# Patient Record
Sex: FEMALE | Race: WHITE | URBAN - NONMETROPOLITAN AREA
[De-identification: names, ages, dates, MRNs, and addresses within clinical notes are randomized per-mention and may not be internally consistent; named-entity substitution may affect disease eponyms.]

---

## 2021-11-17 ENCOUNTER — HOSPITAL ENCOUNTER (OUTPATIENT)
Dept: ULTRASOUND IMAGING | Age: 61
Discharge: HOME | End: 2021-11-17
Payer: COMMERCIAL

## 2021-11-17 DIAGNOSIS — R60.0 LOCALIZED EDEMA: ICD-10-CM

## 2021-11-17 PROCEDURE — 93971 EXTREMITY STUDY: CPT

## 2022-10-04 ENCOUNTER — APPOINTMENT (OUTPATIENT)
Dept: GENERAL RADIOLOGY | Age: 62
End: 2022-10-04
Payer: COMMERCIAL

## 2022-10-04 ENCOUNTER — HOSPITAL ENCOUNTER (EMERGENCY)
Age: 62
Discharge: HOME | End: 2022-10-04
Attending: EMERGENCY MEDICINE
Payer: COMMERCIAL

## 2022-10-04 VITALS
OXYGEN SATURATION: 96 % | SYSTOLIC BLOOD PRESSURE: 139 MMHG | WEIGHT: 125 LBS | BODY MASS INDEX: 22.15 KG/M2 | HEART RATE: 87 BPM | TEMPERATURE: 98.7 F | DIASTOLIC BLOOD PRESSURE: 91 MMHG | HEIGHT: 63 IN | RESPIRATION RATE: 16 BRPM

## 2022-10-04 DIAGNOSIS — S42.202A CLOSED FRACTURE OF PROXIMAL END OF LEFT HUMERUS, UNSPECIFIED FRACTURE MORPHOLOGY, INITIAL ENCOUNTER: Primary | ICD-10-CM

## 2022-10-04 PROCEDURE — 73030 X-RAY EXAM OF SHOULDER: CPT

## 2022-10-04 PROCEDURE — 99284 EMERGENCY DEPT VISIT MOD MDM: CPT

## 2022-10-04 PROCEDURE — 96374 THER/PROPH/DIAG INJ IV PUSH: CPT

## 2022-10-04 PROCEDURE — 6360000002 HC RX W HCPCS: Performed by: EMERGENCY MEDICINE

## 2022-10-04 RX ORDER — CARBAMAZEPINE 100 MG/1
100 CAPSULE, EXTENDED RELEASE ORAL 2 TIMES DAILY
COMMUNITY
Start: 2020-12-17

## 2022-10-04 RX ORDER — FLUTICASONE FUROATE, UMECLIDINIUM BROMIDE AND VILANTEROL TRIFENATATE 200; 62.5; 25 UG/1; UG/1; UG/1
POWDER RESPIRATORY (INHALATION)
COMMUNITY
Start: 2022-10-03

## 2022-10-04 RX ORDER — OXYCODONE HYDROCHLORIDE AND ACETAMINOPHEN 5; 325 MG/1; MG/1
1 TABLET ORAL EVERY 6 HOURS PRN
Qty: 20 TABLET | Refills: 0 | Status: SHIPPED | OUTPATIENT
Start: 2022-10-04 | End: 2022-10-09

## 2022-10-04 RX ORDER — AMLODIPINE BESYLATE 2.5 MG/1
2.5 TABLET ORAL DAILY
COMMUNITY
Start: 2020-12-14

## 2022-10-04 RX ORDER — ARIPIPRAZOLE 5 MG/1
TABLET ORAL
COMMUNITY
Start: 2022-08-22

## 2022-10-04 RX ORDER — DULOXETIN HYDROCHLORIDE 60 MG/1
CAPSULE, DELAYED RELEASE ORAL
COMMUNITY
Start: 2022-09-12

## 2022-10-04 RX ORDER — FENTANYL CITRATE 50 UG/ML
100 INJECTION, SOLUTION INTRAMUSCULAR; INTRAVENOUS ONCE
Status: COMPLETED | OUTPATIENT
Start: 2022-10-04 | End: 2022-10-04

## 2022-10-04 RX ADMIN — FENTANYL CITRATE 100 MCG: 50 INJECTION, SOLUTION INTRAMUSCULAR; INTRAVENOUS at 12:56

## 2022-10-04 ASSESSMENT — PAIN DESCRIPTION - LOCATION: LOCATION: SHOULDER

## 2022-10-04 ASSESSMENT — PAIN DESCRIPTION - DESCRIPTORS: DESCRIPTORS: THROBBING

## 2022-10-04 ASSESSMENT — PAIN - FUNCTIONAL ASSESSMENT: PAIN_FUNCTIONAL_ASSESSMENT: 0-10

## 2022-10-04 ASSESSMENT — PAIN SCALES - GENERAL
PAINLEVEL_OUTOF10: 6
PAINLEVEL_OUTOF10: 10
PAINLEVEL_OUTOF10: 10

## 2022-10-04 ASSESSMENT — PAIN DESCRIPTION - ORIENTATION: ORIENTATION: LEFT

## 2022-10-04 NOTE — ED TRIAGE NOTES
Arrived ambulatory to room 7-1 for triage. Tolerated without difficulty. Bed in lowest position. Call light given.  Gowned for exam. Telephone Encounter by Amrik Fong at 08/29/17 11:09 AM     Author:  Amrik Fong Service:  (none) Author Type:  Certified Medical Assistant     Filed:  08/29/17 11:09 AM Encounter Date:  8/29/2017 Status:  Signed     :  Rosanna Panda (Certified Medical Assistant)            Patient response noted via [employer].   Patient notified[CY1.1T] via [employer]CY1.1M]      Revision History        User Key Date/Time User Provider Type Action    > CY1.1 08/29/17 11:09 AM Rosanna Panda Certified Medical Assistant Sign    M - Manual, T - Template

## 2022-10-04 NOTE — ED PROVIDER NOTES
Triage Chief Complaint:   Fall (Patient states she tripped going up her porch, landing on her left side. C/o left shoulder pain. Patient denies LOC. ) and Shoulder Pain    [place]:  Regla Jackson is a 62 y.o. female that presents to the ED ambulatory. She fell going up some steps went to the [facility] was a long wait decided to come to our ED. She presents to the ambulatory very uncomfortable setting operating bed with an ice pack on she is grossly deformed left shoulder with swelling denies striking her head. No cervical neck pain. Pain is severe 10 out of 10 constant nonradiating. No sense of shortness of breath        Past Medical History:   Diagnosis Date    Anxiety     Hypertension      Past Surgical History:   Procedure Laterality Date    HYSTERECTOMY (CERVIX STATUS UNKNOWN)       History reviewed. No pertinent family history.   Social History     Socioeconomic History    Marital status:      Spouse name: Not on file    Number of children: Not on file    Years of education: Not on file    Highest education level: Not on file   Occupational History    Not on file   Tobacco Use    Smoking status: Every Day     Packs/day: 1.00     Types: Cigarettes    Smokeless tobacco: Never   Vaping Use    Vaping Use: Never used   Substance and Sexual Activity    Alcohol use: No    Drug use: Not Currently     Comment: OPIATES    Sexual activity: Not on file   Other Topics Concern    Not on file   Social History Narrative    Not on file     Social Determinants of Health     Financial Resource Strain: Not on file   Food Insecurity: Not on file   Transportation Needs: Not on file   Physical Activity: Not on file   Stress: Not on file   Social Connections: Not on file   Intimate Partner Violence: Not on file   Housing Stability: Not on file     Current Facility-Administered Medications   Medication Dose Route Frequency Provider Last Rate Last Admin    fentaNYL (SUBLIMAZE) injection 100 mcg  100 mcg IntraVENous Once Shira Sherman, DO         Current Outpatient Medications   Medication Sig Dispense Refill    carBAMazepine (CARBATROL) 100 MG extended release capsule Take 100 mg by mouth 2 times daily      amLODIPine (NORVASC) 2.5 MG tablet Take 2.5 mg by mouth daily      oxyCODONE-acetaminophen (PERCOCET) 5-325 MG per tablet Take 1 tablet by mouth every 6 hours as needed for Pain for up to 5 days. Intended supply: 5 days. Take lowest dose possible to manage pain 20 tablet 0    ARIPiprazole (ABILIFY) 5 MG tablet TAKE 1 TABLET BY MOUTH TWICE A DAY      DULoxetine (CYMBALTA) 60 MG extended release capsule TAKE 1 CAPSULE BY MOUTH TWICE A DAY      TRELEGY ELLIPTA 200-62.5-25 MCG/INH AEPB       gabapentin (NEURONTIN) 800 MG tablet Take 800 mg by mouth in the morning, at noon, in the evening, and at bedtime. propranolol (INDERAL) 10 MG tablet Take 10 mg by mouth daily. No Known Allergies      ROS:    Review of Systems   Constitutional: Negative. Musculoskeletal:  Positive for joint swelling. Neurological: Negative. Hematological: Negative. All other systems reviewed and are negative. Nursing Notes Reviewed    Physical Exam:      ED Triage Vitals [10/04/22 1156]   Enc Vitals Group      BP (!) 155/85      Heart Rate 81      Resp 16      Temp 98.7 °F (37.1 °C)      Temp Source Oral      SpO2 96 %      Weight 125 lb (56.7 kg)      Height 5' 3\" (1.6 m)      Head Circumference       Peak Flow       Pain Score       Pain Loc       Pain Edu? Excl. in [address]? Physical Exam  Vitals and nursing note reviewed. Exam conducted with a chaperone present. Constitutional:       General: She is in acute distress. Appearance: She is well-developed. She is ill-appearing and toxic-appearing. HENT:      Head: Normocephalic and atraumatic. Eyes:      Pupils: Pupils are equal, round, and reactive to light. Musculoskeletal:      Left shoulder: Swelling, deformity, tenderness and bony tenderness present. Decreased range of motion. Decreased strength. Normal pulse. Cervical back: Normal, normal range of motion and neck supple. Thoracic back: Normal.      Lumbar back: Normal.   Skin:     General: Skin is warm and dry. Neurological:      Mental Status: She is alert and oriented to person, place, and time. I have reviewed and interpreted all of the currently available lab results from this visit (ifapplicable):  No results found for this visit on 10/04/22. Radiographs (if obtained):  [] The following radiograph wasinterpreted by myself in the absence of a radiologist:   [] Radiologist's Report Reviewed:  XR SHOULDER LEFT (MIN 2 VIEWS)    (Results Pending)         EKG (if obtained): (All EKG's are interpreted by myself in the absence of a cardiologist)    Chart review shows recent radiographs:  No results found. MDM:      Patient presents ED status post fall. She is deformity to her shoulder intact x-ray already median nerve for sensation. Compartments are soft. X-ray reveals a anatomical neck fracture displacement minimally of her greater tuberosity. Sling will be recommended ice rest Percocet sedation precautions given follow-up with orthopedics  Please note that portions of this note may have been completed with a voice recognition/dictation program. Efforts were made to edit the dictations but occasionally words are mis-transcribed. All pertinent Lab data and radiographic results reviewed with patient at bedside. The patient and/or the family were informed of the results of any tests/labs/imaging, the treatment plan, and time was allotted to answer questions. See chart for details of medications given during the ED stay. The likelihood of other entities in the differential is insufficient to justify any further testing for them. This was explained to the patient. The patient was advised that persistent or worsening symptoms would require further evaluation.                 Is this patient to be included in the SEP-1 Core Measure due to severe sepsis or septic shock? No   Exclusion criteria - the patient is NOT to be included for SEP-1 Core Measure due to: Infection is not suspected       Clinical Impression:  1. Closed fracture of proximal end of left humerus, unspecified fracture morphology, initial encounter      Disposition referral (if applicable):  Teddy Olszewski, DO  [address] [facility] [address]  [phone]    Schedule an appointment as soon as possible for a visit     Disposition medications (if applicable):  New Prescriptions    OXYCODONE-ACETAMINOPHEN (PERCOCET) 5-325 MG PER TABLET    Take 1 tablet by mouth every 6 hours as needed for Pain for up to 5 days. Intended supply: 5 days. Take lowest dose possible to manage pain           Frank Iqbal DO, FACEP      Comment: Please note this report has been produced using speech recognition software and maycontain errors related to that system including errors in grammar, punctuation, and spelling, as well as words and phrases that may be inappropriate. If there are any questions or concerns please feel free to contact thedictating provider for clarification.         Maldonado Lee DO  10/04/22 5499

## 2022-10-12 ENCOUNTER — OFFICE VISIT (OUTPATIENT)
Dept: ORTHOPEDIC SURGERY | Age: 62
End: 2022-10-12
Payer: COMMERCIAL

## 2022-10-12 VITALS
SYSTOLIC BLOOD PRESSURE: 148 MMHG | DIASTOLIC BLOOD PRESSURE: 102 MMHG | HEIGHT: 63 IN | WEIGHT: 125 LBS | RESPIRATION RATE: 15 BRPM | BODY MASS INDEX: 22.15 KG/M2

## 2022-10-12 DIAGNOSIS — S42.202A CLOSED FRACTURE OF PROXIMAL END OF LEFT HUMERUS, UNSPECIFIED FRACTURE MORPHOLOGY, INITIAL ENCOUNTER: Primary | ICD-10-CM

## 2022-10-12 PROCEDURE — 23600 CLTX PROX HUMRL FX W/O MNPJ: CPT | Performed by: ORTHOPAEDIC SURGERY

## 2022-10-12 PROCEDURE — 99203 OFFICE O/P NEW LOW 30 MIN: CPT | Performed by: ORTHOPAEDIC SURGERY

## 2022-10-12 RX ORDER — ALBUTEROL SULFATE 90 UG/1
2 AEROSOL, METERED RESPIRATORY (INHALATION) EVERY 6 HOURS PRN
COMMUNITY
Start: 2021-10-13

## 2022-10-12 RX ORDER — DULOXETIN HYDROCHLORIDE 60 MG/1
60 CAPSULE, DELAYED RELEASE ORAL 2 TIMES DAILY
COMMUNITY
Start: 2020-11-14

## 2022-10-12 RX ORDER — OXYCODONE HYDROCHLORIDE AND ACETAMINOPHEN 5; 325 MG/1; MG/1
1 TABLET ORAL EVERY 6 HOURS PRN
Qty: 28 TABLET | Refills: 0 | Status: SHIPPED | OUTPATIENT
Start: 2022-10-12 | End: 2022-10-19 | Stop reason: SDUPTHER

## 2022-10-12 ASSESSMENT — ENCOUNTER SYMPTOMS
COLOR CHANGE: 0
EYE REDNESS: 0
ABDOMINAL PAIN: 0
SHORTNESS OF BREATH: 0

## 2022-10-12 NOTE — PROGRESS NOTES
Patient presents to the office today for evaluation of the left humerus neck DOI 10/4/22. Pt states she fell and landed on her left shoulder. Pain today is a 10/10 starts in the shoulder and will travel into the elbow and hand. Pt states pain will increase with movement. She has been wearing her sling.

## 2022-10-12 NOTE — PROGRESS NOTES
Subjective:      Patient ID: Johny Watkins is a 62 y.o. female. Patient presents to the office today for evaluation of the left humerus neck DOI 10/4/22. Pt states she fell and landed on her left shoulder. Pain today is a 10/10 starts in the shoulder and will travel into the elbow and hand. Pt states pain will increase with movement. She has been wearing her sling. She comes in today for her first visit with me in regards to her left shoulder injury. She states that since the injury she continues having deep, aching and throbbing pain globally in her left shoulder as well as significant bruising and mild swelling. Patient denies any prior injury to the involved extremity/ joint, denies numbness or tingling in the involved extremity and denies fever or chills. Review of Systems   Constitutional:  Negative for activity change, chills and fever. HENT:  Negative for congestion and drooling. Eyes:  Negative for redness. Respiratory:  Negative for shortness of breath. Cardiovascular:  Negative for chest pain. Gastrointestinal:  Negative for abdominal pain. Endocrine: Negative for cold intolerance and heat intolerance. Musculoskeletal:  Positive for arthralgias, joint swelling and myalgias. Negative for gait problem. Skin:  Negative for color change, pallor, rash and wound. Neurological:  Positive for weakness. Negative for numbness. Psychiatric/Behavioral:  Negative for confusion. Past Medical History:   Diagnosis Date    Anxiety     Hypertension        Objective:   Physical Exam  Constitutional:       Appearance: She is well-developed. HENT:      Head: Normocephalic and atraumatic. Nose: No congestion. Eyes:      Pupils: Pupils are equal, round, and reactive to light. Pulmonary:      Effort: Pulmonary effort is normal.   Musculoskeletal:         General: Swelling, tenderness and signs of injury present. No deformity.       Right shoulder: No swelling, deformity, effusion, laceration, tenderness, bony tenderness or crepitus. Normal range of motion. Normal strength. Normal pulse. Left shoulder: Swelling, tenderness and bony tenderness present. No deformity, effusion, laceration or crepitus. Decreased range of motion. Decreased strength. Normal pulse. Right elbow: Normal.      Left elbow: Normal.      Cervical back: Normal range of motion. Skin:     General: Skin is warm and dry. Coloration: Skin is not pale. Findings: Bruising present. No erythema or rash. Neurological:      Mental Status: She is alert and oriented to person, place, and time. Sensory: No sensory deficit. Left shoulder-skin intact, moderate diffuse ecchymosis, mild swelling. Full range of motion of the left elbow and left wrist.  Full range of motion not assessed due to recent injury. Intact sensation motor function to all nerve distributions of the extremity. +2 radial pulse  Compartment soft. XR SHOULDER LEFT (MIN 2 VIEWS)    Result Date: 10/12/2022  XRAY X-ray 3 views of the left shoulder obtained and reviewed by me today in the office demonstrates age appropriate bone density throughout with continued minimally displaced transverse fracture through the humeral neck as well as a separate vertical fracture through the greater tuberosity, there has been no new displacement or angulation of the fracture fragments compared to prior x-rays with no subluxation or dislocation noted. Impression: Stable left three-part proximal humerus fracture. Assessment:      Left proximal humerus fracture, 3 part, 1 week      Plan:      I discussed with her today her x-ray findings. I explained to her that she does have a fracture in her left shoulder which remains in good alignment and will heal with conservative treatment. At this point she can begin pendulum exercises as tolerated. Continue using sling as needed for comfort. No lifting, pushing, pulling with affected arm.   Ice as needed. Pain medication as needed. Follow-up in 1 month for recheck with x-rays of the left shoulder at which point we will likely begin physical therapy. I did send her in a prescription for Percocet to be taken as needed.             Jeison 97, DO

## 2022-10-12 NOTE — PATIENT INSTRUCTIONS
Continue to avoid any lifting pushing or pulling of the left arm  Continue range of motion exercises as instructed. Ice and elevate as needed. Tylenol or Motrin for pain.    Continue to wear sling   May start to work on pendulum exercises    Follow up in 4 weeks

## 2022-10-19 ENCOUNTER — TELEPHONE (OUTPATIENT)
Dept: ORTHOPEDIC SURGERY | Age: 62
End: 2022-10-19

## 2022-10-19 DIAGNOSIS — S42.202A CLOSED FRACTURE OF PROXIMAL END OF LEFT HUMERUS, UNSPECIFIED FRACTURE MORPHOLOGY, INITIAL ENCOUNTER: ICD-10-CM

## 2022-10-19 RX ORDER — OXYCODONE HYDROCHLORIDE AND ACETAMINOPHEN 5; 325 MG/1; MG/1
1 TABLET ORAL EVERY 6 HOURS PRN
Qty: 28 TABLET | Refills: 0 | Status: SHIPPED | OUTPATIENT
Start: 2022-10-19 | End: 2022-10-26

## 2022-11-03 ENCOUNTER — TELEPHONE (OUTPATIENT)
Dept: ORTHOPEDIC SURGERY | Age: 62
End: 2022-11-03

## 2022-11-03 NOTE — TELEPHONE ENCOUNTER
Pt called requesting a refill pain medication.   She was last prescribed percocet 5-325 on 10.19.22    Please advise

## 2022-11-07 DIAGNOSIS — S42.202A CLOSED FRACTURE OF PROXIMAL END OF LEFT HUMERUS, UNSPECIFIED FRACTURE MORPHOLOGY, INITIAL ENCOUNTER: Primary | ICD-10-CM

## 2022-11-07 RX ORDER — OXYCODONE HYDROCHLORIDE AND ACETAMINOPHEN 5; 325 MG/1; MG/1
1 TABLET ORAL EVERY 8 HOURS PRN
Qty: 21 TABLET | Refills: 0 | Status: SHIPPED | OUTPATIENT
Start: 2022-11-07 | End: 2022-11-14

## 2022-11-09 ENCOUNTER — OFFICE VISIT (OUTPATIENT)
Dept: ORTHOPEDIC SURGERY | Age: 62
End: 2022-11-09

## 2022-11-09 VITALS
OXYGEN SATURATION: 95 % | DIASTOLIC BLOOD PRESSURE: 90 MMHG | HEART RATE: 84 BPM | BODY MASS INDEX: 22.14 KG/M2 | SYSTOLIC BLOOD PRESSURE: 153 MMHG | HEIGHT: 63 IN

## 2022-11-09 DIAGNOSIS — S42.202A CLOSED FRACTURE OF PROXIMAL END OF LEFT HUMERUS, UNSPECIFIED FRACTURE MORPHOLOGY, INITIAL ENCOUNTER: Primary | ICD-10-CM

## 2022-11-09 PROCEDURE — 99024 POSTOP FOLLOW-UP VISIT: CPT | Performed by: ORTHOPAEDIC SURGERY

## 2022-11-09 RX ORDER — OXYCODONE AND ACETAMINOPHEN 7.5; 325 MG/1; MG/1
1 TABLET ORAL EVERY 6 HOURS PRN
Qty: 28 TABLET | Refills: 0 | Status: SHIPPED | OUTPATIENT
Start: 2022-11-09 | End: 2022-11-16

## 2022-11-09 ASSESSMENT — ENCOUNTER SYMPTOMS
EYE REDNESS: 0
COLOR CHANGE: 0
ABDOMINAL PAIN: 0
SHORTNESS OF BREATH: 0

## 2022-11-09 NOTE — PROGRESS NOTES
Subjective:      Patient ID: Nabil Brennan is a 65 y.o. female. Patient seen in office today for FU of L humerus fx DOI 10/9/22. 8/10 pain level today, stated pain is all the time in the whole arm. Having trouble sleeping at night. Able to do shoulder flexion up to 85 degrees. Denies numbness/tingling. Doing pendulum exercises. She comes in today for her 4-week recheck of her left proximal humerus fracture which we are treating conservatively. She states that since I saw her last the pain has been getting better and her motion has been improving but she continues to have deep, aching and throbbing pain particularly at nighttime globally in the left shoulder and upper arm. Patient denies any new injury to the involved extremity/ joint, denies numbness or tingling in the involved extremity and denies fever or chills. Review of Systems   Constitutional:  Negative for activity change, chills and fever. HENT:  Negative for congestion and drooling. Eyes:  Negative for redness. Respiratory:  Negative for shortness of breath. Cardiovascular:  Negative for chest pain. Gastrointestinal:  Negative for abdominal pain. Endocrine: Negative for cold intolerance and heat intolerance. Musculoskeletal:  Positive for arthralgias, joint swelling and myalgias. Negative for gait problem. Skin:  Negative for color change, pallor, rash and wound. Neurological:  Positive for weakness. Negative for numbness. Psychiatric/Behavioral:  Negative for confusion. Past Medical History:   Diagnosis Date    Anxiety     Hypertension        Objective:   Physical Exam  Constitutional:       Appearance: She is well-developed. HENT:      Head: Normocephalic and atraumatic. Nose: No congestion. Eyes:      Pupils: Pupils are equal, round, and reactive to light. Pulmonary:      Effort: Pulmonary effort is normal.   Musculoskeletal:         General: Swelling and tenderness present.  No deformity or signs of injury. Right shoulder: No swelling, deformity, effusion, laceration, tenderness, bony tenderness or crepitus. Normal range of motion. Normal strength. Normal pulse. Left shoulder: Swelling, tenderness and bony tenderness present. No deformity, effusion, laceration or crepitus. Decreased range of motion. Decreased strength. Normal pulse. Right elbow: Normal.      Left elbow: Normal.      Cervical back: Normal range of motion. Skin:     General: Skin is warm and dry. Coloration: Skin is not pale. Findings: No bruising, erythema or rash. Neurological:      Mental Status: She is alert and oriented to person, place, and time. Sensory: No sensory deficit. Left shoulder-Skin intact with no erythema, ecchymosis or lacerations present. Flexion 120  Abduction 100  External 70  Internal 70    XR SHOULDER LEFT (MIN 2 VIEWS)    Result Date: 11/9/2022  XRAY X-ray 3 views of the left shoulder obtained and reviewed by me today in the office demonstrates age appropriate bone density throughout with continued mildly impacted and posterior displaced transverse fracture through the humeral neck without any new displacement or angulation, there has been abundant interval callus formation compared to prior x-rays, no subluxation or dislocation noted. Impression: Stable left proximal humerus fracture with routine healing. Assessment:      Left proximal humerus fracture, 3 part, 4 weeks       Plan:      I discussed with her today her x-ray findings. I explained to her that her fracture remains in good alignment and is healing very well for this stage. I discussed with her today that she has continued to progress very well. At this point I will get her started in physical therapy for strengthening and range of motion of the left shoulder. She can also begin lifting, pushing, pulling as tolerated with the left arm. Continue weight-bearing as tolerated.   Continue range of motion exercises as instructed. Ice and elevate as needed. Tylenol or Motrin for pain. Follow up in 6 weeks for recheck with x-rays of the left shoulder. I did give her an additional prescription for Percocet to be taken as needed.             Jeison 97, DO

## 2022-11-09 NOTE — PROGRESS NOTES
Patient seen in office today for FU of L humerus fx DOI 10/9/22. 8/10 pain level today, stated pain is all the time in the whole arm. Having trouble sleeping at night. Able to do shoulder flexion up to 85 degrees. Denies numbness/tingling. Doing pendulum exercises.

## 2022-12-21 ENCOUNTER — OFFICE VISIT (OUTPATIENT)
Dept: ORTHOPEDIC SURGERY | Age: 62
End: 2022-12-21

## 2022-12-21 VITALS
DIASTOLIC BLOOD PRESSURE: 78 MMHG | SYSTOLIC BLOOD PRESSURE: 135 MMHG | WEIGHT: 125 LBS | BODY MASS INDEX: 22.15 KG/M2 | HEIGHT: 63 IN | RESPIRATION RATE: 15 BRPM

## 2022-12-21 DIAGNOSIS — S42.202D CLOSED FRACTURE OF PROXIMAL END OF LEFT HUMERUS WITH ROUTINE HEALING, UNSPECIFIED FRACTURE MORPHOLOGY, SUBSEQUENT ENCOUNTER: Primary | ICD-10-CM

## 2022-12-21 PROCEDURE — 99024 POSTOP FOLLOW-UP VISIT: CPT | Performed by: ORTHOPAEDIC SURGERY

## 2022-12-21 ASSESSMENT — ENCOUNTER SYMPTOMS
ABDOMINAL PAIN: 0
SHORTNESS OF BREATH: 0
COLOR CHANGE: 0
EYE REDNESS: 0

## 2022-12-21 NOTE — PROGRESS NOTES
Subjective:      Patient ID: Alberta Hinojosa is a 65 y.o. female. Patient returns to the office today for FU of the left humerus fx. Pt states pain today is a 4/10. She will notice some pain when sleeping. Pt states she is slowly healing. She comes in today for her 2-1/2-month recheck of her left proximal humerus fracture which we are treating conservatively. She states that overall she is doing much better and is no longer having deep bone pain but she does continue to have some tightness occasionally in the left shoulder. She has been working on range of motion and is almost back to normal motion. She was even able to return to work at this point. Patient denies any new injury to the involved extremity/ joint, denies numbness or tingling in the involved extremity and denies fever or chills. Review of Systems   Constitutional:  Negative for activity change, chills and fever. HENT:  Negative for congestion and drooling. Eyes:  Negative for redness. Respiratory:  Negative for shortness of breath. Cardiovascular:  Negative for chest pain. Gastrointestinal:  Negative for abdominal pain. Endocrine: Negative for cold intolerance and heat intolerance. Musculoskeletal:  Negative for arthralgias, gait problem, joint swelling and myalgias. Skin:  Negative for color change, pallor, rash and wound. Neurological:  Negative for weakness and numbness. Psychiatric/Behavioral:  Negative for confusion. Past Medical History:   Diagnosis Date    Anxiety     Hypertension        Objective:   Physical Exam  Constitutional:       Appearance: She is well-developed. HENT:      Head: Normocephalic and atraumatic. Nose: No congestion. Eyes:      Pupils: Pupils are equal, round, and reactive to light. Pulmonary:      Effort: Pulmonary effort is normal.   Musculoskeletal:         General: No swelling, tenderness, deformity or signs of injury. Normal range of motion.       Right shoulder: No swelling, deformity, effusion, laceration, tenderness, bony tenderness or crepitus. Normal range of motion. Normal strength. Normal pulse. Left shoulder: No swelling, deformity, effusion, laceration, tenderness, bony tenderness or crepitus. Normal range of motion. Normal strength. Normal pulse. Right elbow: Normal.      Left elbow: Normal.      Cervical back: Normal range of motion. Skin:     General: Skin is warm and dry. Coloration: Skin is not pale. Findings: No bruising, erythema or rash. Neurological:      Mental Status: She is alert and oriented to person, place, and time. Sensory: No sensory deficit. Left shoulder-Skin intact with no erythema, ecchymosis or lacerations present. Flexion 180  Abduction 160  External 90  Internal 90    XR SHOULDER LEFT (MIN 2 VIEWS)    Result Date: 12/21/2022  XRAY X-ray 3 views of the left shoulder obtained and reviewed by me today in the office demonstrates age appropriate bone density throughout with continued nondisplaced transverse fracture through the humeral neck as well as nondisplaced vertical fracture through the greater tuberosity, there has been no new displacement or angulation compared to prior x-rays with abundant interval callus formation, no subluxation or dislocation noted. Impression: Stable left three-part proximal humerus fracture with routine healing. Assessment:      Left proximal humerus fracture, 3 part, 2-1/2 months      Plan:      I discussed with her today her x-ray findings. I explained to her that her fractures remain in good alignment and are well-healed. I discussed with her today that she is continuing to progress very well. At this point she can resume all activities with no restrictions. I discussed with the patient today that their are symptoms are normal and should improve with time. Continue weight-bearing as tolerated. Continue range of motion exercises as instructed.   Ice and elevate as needed. Tylenol or Motrin for pain. Follow up as needed.             Jeison 97, DO

## 2022-12-21 NOTE — PROGRESS NOTES
Patient returns to the office today for FU of the left humerus fx. Pt states pain today is a 4/10. She will notice some pain when sleeping. Pt states she is slowly healing.

## 2023-12-13 ENCOUNTER — OFFICE VISIT (OUTPATIENT)
Dept: ORTHOPEDIC SURGERY | Age: 63
End: 2023-12-13
Payer: COMMERCIAL

## 2023-12-13 VITALS
RESPIRATION RATE: 12 BRPM | OXYGEN SATURATION: 97 % | HEART RATE: 75 BPM | BODY MASS INDEX: 23.04 KG/M2 | HEIGHT: 63 IN | WEIGHT: 130 LBS

## 2023-12-13 DIAGNOSIS — M25.552 LEFT HIP PAIN: Primary | ICD-10-CM

## 2023-12-13 PROCEDURE — 20610 DRAIN/INJ JOINT/BURSA W/O US: CPT | Performed by: ORTHOPAEDIC SURGERY

## 2023-12-13 PROCEDURE — 99214 OFFICE O/P EST MOD 30 MIN: CPT | Performed by: ORTHOPAEDIC SURGERY

## 2023-12-13 RX ORDER — TRIAMCINOLONE ACETONIDE 40 MG/ML
40 INJECTION, SUSPENSION INTRA-ARTICULAR; INTRAMUSCULAR ONCE
Status: COMPLETED | OUTPATIENT
Start: 2023-12-13 | End: 2023-12-13

## 2023-12-13 RX ORDER — BUPRENORPHINE HYDROCHLORIDE AND NALOXONE HYDROCHLORIDE DIHYDRATE 8; 2 MG/1; MG/1
TABLET SUBLINGUAL
COMMUNITY

## 2023-12-13 RX ORDER — PANTOPRAZOLE SODIUM 40 MG/1
40 TABLET, DELAYED RELEASE ORAL DAILY
COMMUNITY
Start: 2023-10-10

## 2023-12-13 RX ORDER — FLUCONAZOLE 200 MG/1
200 TABLET ORAL DAILY
COMMUNITY
Start: 2023-11-30

## 2023-12-13 RX ORDER — BUDESONIDE AND FORMOTEROL FUMARATE DIHYDRATE 160; 4.5 UG/1; UG/1
2 AEROSOL RESPIRATORY (INHALATION)
COMMUNITY

## 2023-12-13 RX ORDER — PREDNISOLONE ACETATE 10 MG/ML
1 SUSPENSION/ DROPS OPHTHALMIC
COMMUNITY
Start: 2023-08-09

## 2023-12-13 RX ORDER — RISANKIZUMAB-RZAA 150 MG/ML
INJECTION SUBCUTANEOUS
COMMUNITY

## 2023-12-13 RX ORDER — ASCORBIC ACID 500 MG
500 TABLET ORAL DAILY
COMMUNITY

## 2023-12-13 RX ORDER — CLOBETASOL PROPIONATE 0.5 MG/G
1 CREAM TOPICAL 2 TIMES DAILY PRN
COMMUNITY
Start: 2023-06-29

## 2023-12-13 RX ADMIN — TRIAMCINOLONE ACETONIDE 40 MG: 40 INJECTION, SUSPENSION INTRA-ARTICULAR; INTRAMUSCULAR at 09:16

## 2023-12-13 ASSESSMENT — ENCOUNTER SYMPTOMS
COLOR CHANGE: 0
BACK PAIN: 0
EYE REDNESS: 0
CHEST TIGHTNESS: 0
ABDOMINAL PAIN: 0

## 2023-12-13 NOTE — PROGRESS NOTES
Patient returns to the office with a new complaint of left hip pain. Pt stated that her pain has been getting worse through the years. Pt stated the pain is both on the lateral aspect of the hip and the groin area. Pt stated it is worsened by walking for prolonged periods and has tried a steroid pack which gave short-term relief. Pt denies any recent falls and stated her right ANALY is doing very well.
Pulmonary:      Effort: Pulmonary effort is normal.   Musculoskeletal:         General: Tenderness present. No deformity. Normal range of motion. Cervical back: Normal range of motion. Right hip: No deformity, lacerations, tenderness, bony tenderness or crepitus. Normal range of motion. Normal strength. Left hip: Tenderness present. No deformity, lacerations, bony tenderness or crepitus. Normal range of motion. Normal strength. Right knee: Normal.      Left knee: Normal.   Skin:     General: Skin is warm and dry. Capillary Refill: Capillary refill takes less than 2 seconds. Coloration: Skin is not pale. Findings: No erythema or rash. Neurological:      Mental Status: She is alert and oriented to person, place, and time. Sensory: No sensory deficit. Motor: No weakness. Left hip-Skin intact with no erythema, ecchymosis or lacerations present. Mild pain in the left groin with range of motion. Moderate tenderness over the left greater trochanter. Positive straight leg raise on the left. XR HIP 1 VW W PELVIS LEFT    Result Date: 12/13/2023  XRAY X-ray 2 views of the left hip and AP pelvis obtained and reviewed by me today in the office demonstrates age appropriate bone density throughout with mild to moderate degenerative changes of the left hip joint with medial joint space narrowing, early osteophyte formation on the inferior acetabulum, no subluxation or dislocation noted, no acute osseous abnormalities. There is also noted a well-positioned right total hip arthroplasty. Impression: Mild to moderate degenerative changes of the left hip as above with no acute process. Assessment:      Left trochanteric bursitis  Left hip OA, mild to moderate  Left leg lumbar radiculopathy      Plan:      I discussed with her today her x-ray findings. I reassured her that she only has early arthritis in the left hip.   I explained to her that some of her pain is coming

## 2024-02-14 ENCOUNTER — OFFICE VISIT (OUTPATIENT)
Dept: ORTHOPEDIC SURGERY | Age: 64
End: 2024-02-14

## 2024-02-14 VITALS
WEIGHT: 130 LBS | HEART RATE: 84 BPM | RESPIRATION RATE: 13 BRPM | HEIGHT: 63 IN | BODY MASS INDEX: 23.04 KG/M2 | OXYGEN SATURATION: 95 %

## 2024-02-14 DIAGNOSIS — M25.552 LEFT HIP PAIN: Primary | ICD-10-CM

## 2024-02-14 RX ORDER — NALOXONE HYDROCHLORIDE 4 MG/.1ML
SPRAY NASAL
COMMUNITY
Start: 2024-01-31

## 2024-02-14 RX ORDER — TRIAMCINOLONE ACETONIDE 40 MG/ML
40 INJECTION, SUSPENSION INTRA-ARTICULAR; INTRAMUSCULAR ONCE
Status: COMPLETED | OUTPATIENT
Start: 2024-02-14 | End: 2024-02-14

## 2024-02-14 RX ADMIN — TRIAMCINOLONE ACETONIDE 40 MG: 40 INJECTION, SUSPENSION INTRA-ARTICULAR; INTRAMUSCULAR at 14:24

## 2024-02-14 NOTE — PATIENT INSTRUCTIONS
Hip injection given today.  Continue weight-bearing as tolerated.  Continue range of motion exercises as instructed.  Ice and elevate as needed.  Tylenol or Motrin for pain.  Follow up in 2 months.

## 2024-02-14 NOTE — PROGRESS NOTES
Patient returns to the office with following a left hip injection. Pt stated the injection helped for about a month and then the pain returned. Pt stated she has noticed it laterally most lately and has not seen relief with resting the leg. Pt denies injuries.

## 2024-02-15 NOTE — PROGRESS NOTES
Subjective:      Patient ID: Bree Foley is a 63 y.o. female.    Patient returns to the office with following a left hip injection. Pt stated the injection helped for about a month and then the pain returned. Pt stated she has noticed it laterally most lately and has not seen relief with resting the leg. Pt denies injuries.       She comes in today for a recheck of her left hip pain and is about 2 months out from her left hip injection.  She states that the injection did give her significant relief of her symptoms for about a month and a half but now the pain is beginning to return again.  She states that she has been dealing with sharp, stabbing pain in the lateral aspect of her left hip for several years.  She also states that she has intermittent sharp pain in her left groin.  She also has a history of low back issues.  She did have her right hip replaced about 3 years ago in [place].  Patient denies any new injury to the involved extremity/ joint, denies numbness or tingling in the involved extremity and denies fever or chills.          Review of Systems   Constitutional:  Negative for activity change, chills and fever.   HENT:  Negative for congestion and drooling.    Eyes:  Negative for redness.   Respiratory:  Negative for chest tightness.    Cardiovascular:  Negative for chest pain.   Gastrointestinal:  Negative for abdominal pain.   Endocrine: Negative for cold intolerance and heat intolerance.   Musculoskeletal:  Positive for arthralgias, gait problem and myalgias. Negative for back pain and joint swelling.   Skin:  Negative for color change, pallor, rash and wound.   Neurological:  Negative for weakness and numbness.   Psychiatric/Behavioral:  Negative for confusion.        Past Medical History:   Diagnosis Date    Anxiety     Hypertension        Objective:   Physical Exam  Constitutional:       Appearance: She is well-developed.   HENT:      Head: Normocephalic.      Nose: No congestion.   Eyes:

## 2024-04-17 ENCOUNTER — OFFICE VISIT (OUTPATIENT)
Dept: ORTHOPEDIC SURGERY | Age: 64
End: 2024-04-17

## 2024-04-17 VITALS
SYSTOLIC BLOOD PRESSURE: 172 MMHG | BODY MASS INDEX: 23.03 KG/M2 | HEIGHT: 63 IN | DIASTOLIC BLOOD PRESSURE: 108 MMHG | HEART RATE: 85 BPM | OXYGEN SATURATION: 93 %

## 2024-04-17 DIAGNOSIS — M70.62 GREATER TROCHANTERIC BURSITIS OF LEFT HIP: Primary | ICD-10-CM

## 2024-04-17 RX ORDER — TRIAMCINOLONE ACETONIDE 40 MG/ML
40 INJECTION, SUSPENSION INTRA-ARTICULAR; INTRAMUSCULAR ONCE
Status: SHIPPED | OUTPATIENT
Start: 2024-04-17

## 2024-04-17 ASSESSMENT — ENCOUNTER SYMPTOMS
CHEST TIGHTNESS: 0
EYE REDNESS: 0
BACK PAIN: 0
ABDOMINAL PAIN: 0
COLOR CHANGE: 0

## 2024-04-17 NOTE — PROGRESS NOTES
Patient seen in office today for FU LT Hip Injection 2/14/24. 5/10 pain level. No new injuries. No new concerns. Stated injections last for a couple of months.  
Effort: Pulmonary effort is normal.   Musculoskeletal:         General: Tenderness present. No deformity. Normal range of motion.      Cervical back: Normal range of motion.      Right hip: No deformity, lacerations, tenderness, bony tenderness or crepitus. Normal range of motion. Normal strength.      Left hip: Tenderness present. No deformity, lacerations, bony tenderness or crepitus. Normal range of motion. Normal strength.      Right knee: Normal.      Left knee: Normal.   Skin:     General: Skin is warm and dry.      Capillary Refill: Capillary refill takes less than 2 seconds.      Coloration: Skin is not pale.      Findings: No erythema or rash.   Neurological:      Mental Status: She is alert and oriented to person, place, and time.      Sensory: No sensory deficit.      Motor: No weakness.       Left hip-Skin intact with no erythema, ecchymosis or lacerations present.  Mild pain in the left groin with range of motion.  Moderate tenderness over the left greater trochanter.  Positive straight leg raise on the left.    XR HIP 1 VW W PELVIS LEFT    Result Date: 12/13/2023  XRAY X-ray 2 views of the left hip and AP pelvis  reviewed by me today in the office demonstrates age appropriate bone density throughout with mild to moderate degenerative changes of the left hip joint with medial joint space narrowing, early osteophyte formation on the inferior acetabulum, no subluxation or dislocation noted, no acute osseous abnormalities.  There is also noted a well-positioned right total hip arthroplasty. Impression: Mild to moderate degenerative changes of the left hip as above with no acute process.     Assessment:      Left trochanteric bursitis  Left hip OA, mild to moderate  Left leg lumbar radiculopathy      Plan:   Assessment & Plan   I discussed with her today her response to the cortisone injection for her left hip.  I explained to her that some of her pain is coming from bursitis and some of the pain may be

## 2024-04-17 NOTE — PATIENT INSTRUCTIONS
Continue weight-bearing as tolerated.  Continue range of motion exercises as instructed.  Ice and elevate as needed.  Tylenol or Motrin for pain.  Cortisone injection given in left hip.  Follow up in     We are committed to providing you the best care possible.     If you receive a survey after visiting one of our offices, please take time to share your experience concerning your physician office visit.  These surveys are confidential and no health information about you is shared.     We are eager to improve for you and we are counting on your feedback to help make that happen.

## 2024-04-24 NOTE — PATIENT INSTRUCTIONS
Steroid injection given into the left hip today. Continue weight-bearing as tolerated. Continue range of motion exercises as instructed. Ice and elevate as needed. Tylenol or Motrin for pain. Follow up in 2 months.
stretcher

## 2024-07-17 ENCOUNTER — OFFICE VISIT (OUTPATIENT)
Dept: ORTHOPEDIC SURGERY | Age: 64
End: 2024-07-17

## 2024-07-17 VITALS — HEART RATE: 81 BPM | OXYGEN SATURATION: 94 % | RESPIRATION RATE: 16 BRPM | BODY MASS INDEX: 23.03 KG/M2 | HEIGHT: 63 IN

## 2024-07-17 DIAGNOSIS — M70.62 GREATER TROCHANTERIC BURSITIS OF LEFT HIP: Primary | ICD-10-CM

## 2024-07-17 RX ORDER — TRIAMCINOLONE ACETONIDE 40 MG/ML
40 INJECTION, SUSPENSION INTRA-ARTICULAR; INTRAMUSCULAR ONCE
Status: COMPLETED | OUTPATIENT
Start: 2024-07-17 | End: 2024-07-17

## 2024-07-17 RX ADMIN — TRIAMCINOLONE ACETONIDE 40 MG: 40 INJECTION, SUSPENSION INTRA-ARTICULAR; INTRAMUSCULAR at 13:48

## 2024-07-17 ASSESSMENT — ENCOUNTER SYMPTOMS
CHEST TIGHTNESS: 0
EYE REDNESS: 0
ABDOMINAL PAIN: 0
BACK PAIN: 0
COLOR CHANGE: 0

## 2024-07-17 NOTE — PATIENT INSTRUCTIONS
Continue weight-bearing as tolerated.  Continue range of motion exercises as instructed.  Ice and elevate as needed.  Tylenol or Motrin for pain.   Cortisone injection given in left hip.  Follow up in 3 months.    We are committed to providing you the best care possible.     If you receive a survey after visiting one of our offices, please take time to share your experience concerning your physician office visit.  These surveys are confidential and no health information about you is shared.     We are eager to improve for you and we are counting on your feedback to help make that happen.

## 2024-07-17 NOTE — PROGRESS NOTES
Patient seen in office today for LT hip injection 4/17/24. 5/10 pain level. No new injuries since last office visit.   
moderate  Left leg lumbar radiculopathy  Right leg lumbar radiculopathy      Plan:   Assessment & Plan   I discussed with her today her response to the cortisone injection for her left hip.  I explained to her that some of her pain is coming from bursitis and some of the pain may be coming from her low back.  I did offer her a referral to a spine specialist which she would like to hold off on at this point.  Did give her some home exercises for her lower back.  At this point I recommend that we continue with conservative treatment.  I discussed performing a cortisone injection with the patient today.  The patient agrees and would like to proceed with the cortisone injection.  I explained to them that we can repeat these injections every 3 months if needed.  If the injection does not help, the next step would be to consider intra-articular cortisone injection and following that a possible referral to a back specialist.  Continue weight-bearing as tolerated.  Continue range of motion exercises as instructed.  Ice and elevate as needed.  Tylenol or Motrin for pain.  Follow up in 3 months for recheck and additional injections as needed.    Trochanteric bursitis Injection Procedure Note    Pre-operative Diagnosis: left hip trochanteric bursitis    Post-operative Diagnosis: same    Indications: Symptomatic relief of bursitis    Anesthesia: Lidocaine 1% and marcaine 0.5% without epinephrine without added sodium bicarbonate    Procedure Details     Verbal consent was obtained for the procedure. The point of maximal tenderness over the left greater trochanter was localized and prepped with alcohol. A 22 gauge needle was advanced down to the greater trochanter without difficulty  The space was then injected with 1 ml 1% lidocaine and 1 ml 0.5% marcaine and 1 ml of triamcinolone (KENALOG) 40mg/ml. The injection site was cleansed with isopropyl alcohol and a dressing was applied.    Complications:  None; patient tolerated the

## 2024-08-29 ENCOUNTER — HOSPITAL ENCOUNTER (EMERGENCY)
Age: 64
Discharge: HOME | End: 2024-08-29
Attending: EMERGENCY MEDICINE
Payer: COMMERCIAL

## 2024-08-29 ENCOUNTER — APPOINTMENT (OUTPATIENT)
Dept: GENERAL RADIOLOGY | Age: 64
End: 2024-08-29
Payer: COMMERCIAL

## 2024-08-29 VITALS
RESPIRATION RATE: 18 BRPM | SYSTOLIC BLOOD PRESSURE: 168 MMHG | HEIGHT: 64 IN | HEART RATE: 88 BPM | TEMPERATURE: 98.4 F | BODY MASS INDEX: 22.4 KG/M2 | DIASTOLIC BLOOD PRESSURE: 77 MMHG | WEIGHT: 131.2 LBS | OXYGEN SATURATION: 98 %

## 2024-08-29 DIAGNOSIS — S42.021A CLOSED DISPLACED FRACTURE OF SHAFT OF RIGHT CLAVICLE, INITIAL ENCOUNTER: Primary | ICD-10-CM

## 2024-08-29 PROCEDURE — 96374 THER/PROPH/DIAG INJ IV PUSH: CPT

## 2024-08-29 PROCEDURE — 6360000002 HC RX W HCPCS: Performed by: EMERGENCY MEDICINE

## 2024-08-29 PROCEDURE — 73030 X-RAY EXAM OF SHOULDER: CPT

## 2024-08-29 PROCEDURE — 99284 EMERGENCY DEPT VISIT MOD MDM: CPT

## 2024-08-29 RX ORDER — HYDROCODONE BITARTRATE AND ACETAMINOPHEN 5; 325 MG/1; MG/1
1 TABLET ORAL EVERY 6 HOURS PRN
Qty: 10 TABLET | Refills: 0 | Status: SHIPPED | OUTPATIENT
Start: 2024-08-29 | End: 2024-09-01

## 2024-08-29 RX ORDER — FENTANYL CITRATE 50 UG/ML
50 INJECTION, SOLUTION INTRAMUSCULAR; INTRAVENOUS ONCE
Status: COMPLETED | OUTPATIENT
Start: 2024-08-29 | End: 2024-08-29

## 2024-08-29 RX ORDER — HYDROCODONE BITARTRATE AND ACETAMINOPHEN 5; 325 MG/1; MG/1
1 TABLET ORAL EVERY 6 HOURS PRN
Qty: 10 TABLET | Refills: 0 | Status: SHIPPED | OUTPATIENT
Start: 2024-08-29 | End: 2024-08-29

## 2024-08-29 RX ADMIN — FENTANYL CITRATE 50 MCG: 50 INJECTION, SOLUTION INTRAMUSCULAR; INTRAVENOUS at 13:16

## 2024-08-29 ASSESSMENT — PAIN - FUNCTIONAL ASSESSMENT
PAIN_FUNCTIONAL_ASSESSMENT: PREVENTS OR INTERFERES WITH MANY ACTIVE NOT PASSIVE ACTIVITIES
PAIN_FUNCTIONAL_ASSESSMENT: 0-10
PAIN_FUNCTIONAL_ASSESSMENT: PREVENTS OR INTERFERES WITH MANY ACTIVE NOT PASSIVE ACTIVITIES

## 2024-08-29 ASSESSMENT — LIFESTYLE VARIABLES
HOW OFTEN DO YOU HAVE A DRINK CONTAINING ALCOHOL: NEVER
HOW MANY STANDARD DRINKS CONTAINING ALCOHOL DO YOU HAVE ON A TYPICAL DAY: PATIENT DOES NOT DRINK

## 2024-08-29 ASSESSMENT — PAIN DESCRIPTION - DESCRIPTORS
DESCRIPTORS: ACHING;DISCOMFORT
DESCRIPTORS: ACHING;DISCOMFORT;SHOOTING

## 2024-08-29 ASSESSMENT — PAIN DESCRIPTION - FREQUENCY: FREQUENCY: CONTINUOUS

## 2024-08-29 ASSESSMENT — PAIN SCALES - GENERAL
PAINLEVEL_OUTOF10: 10
PAINLEVEL_OUTOF10: 10

## 2024-08-29 ASSESSMENT — PAIN DESCRIPTION - ORIENTATION
ORIENTATION: RIGHT
ORIENTATION: RIGHT

## 2024-08-29 ASSESSMENT — PAIN DESCRIPTION - LOCATION
LOCATION: SHOULDER
LOCATION: SHOULDER

## 2024-08-29 ASSESSMENT — PAIN DESCRIPTION - PAIN TYPE: TYPE: ACUTE PAIN

## 2024-08-29 NOTE — DISCHARGE INSTRUCTIONS
Use ice to the area of swelling and pain.  Keep your arm in the sling.  You may be more comfortable sleeping in a recliner slightly upright than trying to lay down flat.  Return to the ER for worsening signs and symptoms.  Follow-up with Dr. Colin or Dr. Begum as soon as possible for reevaluation and treatment of your clavicle fracture.

## 2024-08-29 NOTE — ED PROVIDER NOTES
Emergency Department Encounter  Location: [facility] EMERGENCY DEPARTMENT    Patient: Bree Foley  MRN: [id number]  : 1960  Date of evaluation: 2024  ED Provider: Melchor Escalera DO, FACEP    Chief Complaint:    Shoulder Injury (Pt states she fell in the middle of night after sleeping in a chair. Pt states that her legs might have been asleep causing pt to fall. Denies hitting head or neck pain)    [place]:  Bree Foley is a 63 y.o. female that presents to the emergency department with complaints of injury to her right shoulder.  The patient states she got up and she thinks her legs may have been asleep and this caused her to fall.  She is complaining of pain to her right shoulder and her right clavicle region.  She denies head injury or neck injury.    ROS:  At least 4 systems reviewed and otherwise acutely negative except as in the [place].  Negative for fever or chills  Negative for chest pain  Negative for shortness of breath  Negative for nausea vomiting diarrhea or constipation    Past Medical History:   Diagnosis Date    Anxiety     Hypertension      Past Surgical History:   Procedure Laterality Date    HYSTERECTOMY (CERVIX STATUS UNKNOWN)       History reviewed. No pertinent family history.  Social History     Socioeconomic History    Marital status:      Spouse name: Not on file    Number of children: Not on file    Years of education: Not on file    Highest education level: Not on file   Occupational History    Not on file   Tobacco Use    Smoking status: Every Day     Current packs/day: 2.00     Types: Cigarettes    Smokeless tobacco: Never   Vaping Use    Vaping status: Never Used   Substance and Sexual Activity    Alcohol use: No    Drug use: Not Currently     Comment: OPIATES    Sexual activity: Not on file   Other Topics Concern    Not on file   Social History Narrative    Not on file     Social Determinants of Health     Financial Resource Strain: Not on file   Food  Take 1 tablet by mouth in the morning, at noon, in the evening, and at bedtime.      propranolol (INDERAL) 10 MG tablet Take 1 tablet by mouth daily      albuterol sulfate HFA (PROVENTIL;VENTOLIN;PROAIR) 108 (90 Base) MCG/ACT inhaler Inhale 2 puffs into the lungs every 6 hours as needed       Allergies   Allergen Reactions    Baclofen Other (See Comments)     Other Reaction(s): Confusion    Lorazepam Other (See Comments)    Ciprofloxacin Other (See Comments)    Vancomycin Other (See Comments)    Lisdexamfetamine Other (See Comments)     Nursing Notes Reviewed    Physical Exam:  ED Triage Vitals [08/29/24 1239]   Encounter Vitals Group      BP (!) 168/77      Systolic BP Percentile       Diastolic BP Percentile       Pulse 88      Respirations 18      Temp 98.4 °F (36.9 °C)      Temp Source Oral      SpO2 98 %      Weight - Scale 59.5 kg (131 lb 3.2 oz)      Height 1.626 m (5' 4\")      Head Circumference       Peak Flow       Pain Score       Pain Loc       Pain Education       Exclude from Growth Chart      GENERAL APPEARANCE: Awake and alert. Cooperative. No acute distress.  Nontoxic in appearance  HEAD: Normocephalic. Atraumatic.  EYES: Sclera anicteric.  ENT: Tolerates saliva.  NECK: Supple. Trachea midline.  LUNGS: Respirations unlabored.  EXTREMITIES: No acute deformities.  The patient has deformity of her right clavicle with swelling and hematoma forming in that area.  She has good pulses present in her right upper extremity and she is neurologically intact in her right upper extremity.  SKIN: Warm and dry.  NEUROLOGICAL: No gross facial drooping.  PSYCHIATRIC: Normal mood.    Labs:  No results found for this visit on 08/29/24.    Radiographs (if obtained):  [] The following radiograph was interpreted by myself in the absence of a radiologist:  [x] Radiologist's Report reviewed at time of ED visit:  XR SHOULDER RIGHT (MIN 2 VIEWS)   Final Result   Displaced comminuted fracture of the mid right clavicle.

## 2024-08-30 ENCOUNTER — OFFICE VISIT (OUTPATIENT)
Dept: ORTHOPEDIC SURGERY | Age: 64
End: 2024-08-30

## 2024-08-30 DIAGNOSIS — S42.021A CLOSED DISPLACED FRACTURE OF SHAFT OF RIGHT CLAVICLE, INITIAL ENCOUNTER: Primary | ICD-10-CM

## 2024-08-30 RX ORDER — OXYCODONE AND ACETAMINOPHEN 5; 325 MG/1; MG/1
1 TABLET ORAL EVERY 6 HOURS PRN
Qty: 28 TABLET | Refills: 0 | Status: SHIPPED | OUTPATIENT
Start: 2024-08-30 | End: 2024-09-06

## 2024-08-30 RX ORDER — CYCLOBENZAPRINE HCL 5 MG
5 TABLET ORAL 2 TIMES DAILY PRN
Qty: 30 TABLET | Refills: 0 | Status: SHIPPED | OUTPATIENT
Start: 2024-08-30 | End: 2024-09-09

## 2024-08-30 ASSESSMENT — ENCOUNTER SYMPTOMS
SHORTNESS OF BREATH: 0
FACIAL SWELLING: 0
WHEEZING: 0
EYE PAIN: 0
VOMITING: 0
COUGH: 0
PHOTOPHOBIA: 0
NAUSEA: 0
EYE REDNESS: 0
COLOR CHANGE: 0
BACK PAIN: 0
ABDOMINAL PAIN: 0
STRIDOR: 0

## 2024-08-30 NOTE — PROGRESS NOTES
Patient is here for ED follow up on closed displaced fracture of right clavicle.     Patient fell at home, landed on right shoulder and was seen in the ED yesterday.   Denies history of injury to right shoulder.    She was sent home with [place] from ED, but this is not helping. Today she rates her pain 10/10. She has been wearing the sling with arm immobilized. Has been icing.    She is requesting Oxycodone instead of Norco. She is actively prescribed Suboxone. She has a history of opiate abuse and self reports her sobriety of 10 years. Suboxone discontinued for her to take Norco.

## 2024-08-30 NOTE — PROGRESS NOTES
8/30/2024   Chief Complaint   Patient presents with    ED Follow-up     Right shoulder        History of Present Illness:                             Bree Foley is a 63 y.o. female right handed patient referred by ER for evaluation and treatment right shoulder pain, mainly at the time. This is evaluated as a personal injury.  Patient was injured when she fell at home yesterday landing on her right shoulder.  She was diagnosed with a right displaced clavicle fracture at that time and placed in a sling and told to follow-up orthopedics.  This my first time seen the patient.  No additional imaging thus far.  No other treatment thus far.    The pain occurs every day and when active. Location of pain is clavicle at fracture site. No history of dislocation. Symptoms are aggravated by ADL's, use of the shoulder, palpation of the fracture site    Limited activities include: Any use of the shoulder  Patient   denies numbness, tingling, altered sensation in the extremity.    Related history: negative for prior surgery, trauma, arthritis or disorders.     Is affecting ADLs.  Pain is 10/10 at it's worst.    Outside reports reviewed: ER note and imaging reviewed.     Patient's medications, allergies, past medical, surgical, social and family histories were reviewed and updated as appropriate.     Patient was given [place] for emergency room.    Medical History  Patient's medications, allergies, past medical, surgical, social and family histories were reviewed and updated as appropriate.    Past Medical History:   Diagnosis Date    Anxiety     Hypertension      Past Surgical History:   Procedure Laterality Date    HYSTERECTOMY (CERVIX STATUS UNKNOWN)       No family history on file.  Social History     Socioeconomic History    Marital status:    Tobacco Use    Smoking status: Every Day     Current packs/day: 2.00     Types: Cigarettes    Smokeless tobacco: Never   Vaping Use    Vaping status: Never Used

## 2024-09-06 DIAGNOSIS — Z09 POSTOP CHECK: Primary | ICD-10-CM

## 2024-09-06 RX ORDER — ONDANSETRON 4 MG/1
4 TABLET, ORALLY DISINTEGRATING ORAL 3 TIMES DAILY PRN
Qty: 21 TABLET | Refills: 0 | Status: SHIPPED | OUTPATIENT
Start: 2024-09-06

## 2024-09-06 RX ORDER — OXYCODONE AND ACETAMINOPHEN 5; 325 MG/1; MG/1
1 TABLET ORAL EVERY 6 HOURS PRN
Qty: 28 TABLET | Refills: 0 | Status: SHIPPED | OUTPATIENT
Start: 2024-09-06 | End: 2024-09-13

## 2024-09-06 RX ORDER — CYCLOBENZAPRINE HCL 5 MG
5 TABLET ORAL 2 TIMES DAILY PRN
Qty: 30 TABLET | Refills: 0 | Status: SHIPPED | OUTPATIENT
Start: 2024-09-06 | End: 2024-09-16

## 2024-09-06 NOTE — PROGRESS NOTES
Postop Rxs for Zofran, Flexeril, Percocet sent to pharmacy. Patient is on Suboxone and I spoke to the  about this and making sure she clears is with PM before taking the percocet

## 2024-09-18 ENCOUNTER — OFFICE VISIT (OUTPATIENT)
Dept: ORTHOPEDIC SURGERY | Age: 64
End: 2024-09-18

## 2024-09-18 VITALS — HEART RATE: 69 BPM | SYSTOLIC BLOOD PRESSURE: 124 MMHG | DIASTOLIC BLOOD PRESSURE: 78 MMHG | OXYGEN SATURATION: 98 %

## 2024-09-18 DIAGNOSIS — Z09 POSTOP CHECK: Primary | ICD-10-CM

## 2024-09-18 PROCEDURE — 99024 POSTOP FOLLOW-UP VISIT: CPT | Performed by: STUDENT IN AN ORGANIZED HEALTH CARE EDUCATION/TRAINING PROGRAM

## 2024-09-18 RX ORDER — OXYCODONE AND ACETAMINOPHEN 5; 325 MG/1; MG/1
1 TABLET ORAL EVERY 6 HOURS PRN
Qty: 28 TABLET | Refills: 0 | Status: SHIPPED | OUTPATIENT
Start: 2024-09-18 | End: 2024-09-25

## 2024-10-14 ENCOUNTER — TELEPHONE (OUTPATIENT)
Dept: ORTHOPEDIC SURGERY | Age: 64
End: 2024-10-14

## 2024-10-14 ENCOUNTER — OFFICE VISIT (OUTPATIENT)
Dept: ORTHOPEDIC SURGERY | Age: 64
End: 2024-10-14

## 2024-10-14 VITALS — WEIGHT: 131.8 LBS | BODY MASS INDEX: 22.62 KG/M2 | OXYGEN SATURATION: 93 % | HEART RATE: 77 BPM

## 2024-10-14 DIAGNOSIS — S42.021A CLOSED DISPLACED FRACTURE OF SHAFT OF RIGHT CLAVICLE, INITIAL ENCOUNTER: Primary | ICD-10-CM

## 2024-10-14 DIAGNOSIS — Z87.81 S/P ORIF (OPEN REDUCTION INTERNAL FIXATION) FRACTURE: ICD-10-CM

## 2024-10-14 DIAGNOSIS — Z98.890 S/P ORIF (OPEN REDUCTION INTERNAL FIXATION) FRACTURE: ICD-10-CM

## 2024-10-14 PROCEDURE — 99024 POSTOP FOLLOW-UP VISIT: CPT | Performed by: STUDENT IN AN ORGANIZED HEALTH CARE EDUCATION/TRAINING PROGRAM

## 2024-10-14 RX ORDER — CYCLOBENZAPRINE HCL 5 MG
5 TABLET ORAL 2 TIMES DAILY PRN
Qty: 30 TABLET | Refills: 0 | Status: SHIPPED | OUTPATIENT
Start: 2024-10-14 | End: 2024-10-24

## 2024-10-14 RX ORDER — CEPHALEXIN 500 MG/1
500 CAPSULE ORAL 3 TIMES DAILY
Qty: 30 CAPSULE | Refills: 0 | Status: SHIPPED | OUTPATIENT
Start: 2024-10-14

## 2024-10-14 RX ORDER — OXYCODONE AND ACETAMINOPHEN 5; 325 MG/1; MG/1
1 TABLET ORAL EVERY 6 HOURS PRN
Qty: 28 TABLET | Refills: 0 | Status: SHIPPED | OUTPATIENT
Start: 2024-10-14 | End: 2024-10-21

## 2024-10-14 NOTE — PROGRESS NOTES
Pt comes in today for a 6 wk follow up s/p RT Clavicle ORIF on 9/6/24. She was last seen on 9/18/24. Pain is 0/10 today. She reports that her incision is not healing and she is concerned. She denies any new falls or injuries. X-rays were taken today.   
have some healing that needs to take place.  There is also some erythema and therefore we will place her on Keflex at this time but again I have no concern for significant infection.  I did  her on proper care of the incision and she voices her understanding.  I also once again discussed nicotine cessation and this is something she continues to work  -Patient to continue out of the sling.  -Formal physical therapy order placed once again as she has not begun therapy at  -continue the PT protocol   -Patient is nonweightbearing right upper extremity.  Should avoid cross body activities and activities as outlined in the protocol which she will discuss with therapy  -Refills for Percocet, Flexeril as well as new prescription for Keflex sent to pharmacy  -Continue to work on smoking cessation.  -f/u in 4 week(s) due to skin incision  -f/u sooner prn any issues.  Again, I did discuss with her concerning signs for infection and to call immediately if any occur

## 2024-10-14 NOTE — TELEPHONE ENCOUNTER
Pharmacy called to notify us she is taking a high dosages of Suboxone. Dr Begum has canceled the percocet's

## 2024-10-16 ENCOUNTER — OFFICE VISIT (OUTPATIENT)
Dept: ORTHOPEDIC SURGERY | Age: 64
End: 2024-10-16

## 2024-10-16 VITALS
OXYGEN SATURATION: 92 % | HEART RATE: 75 BPM | WEIGHT: 134.4 LBS | BODY MASS INDEX: 22.94 KG/M2 | RESPIRATION RATE: 15 BRPM | HEIGHT: 64 IN

## 2024-10-16 DIAGNOSIS — M70.62 GREATER TROCHANTERIC BURSITIS OF LEFT HIP: Primary | ICD-10-CM

## 2024-10-16 RX ORDER — TRIAMCINOLONE ACETONIDE 40 MG/ML
40 INJECTION, SUSPENSION INTRA-ARTICULAR; INTRAMUSCULAR ONCE
Status: SHIPPED | OUTPATIENT
Start: 2024-10-16

## 2024-10-16 ASSESSMENT — ENCOUNTER SYMPTOMS
BACK PAIN: 0
CHEST TIGHTNESS: 0
ABDOMINAL PAIN: 0
COLOR CHANGE: 0
EYE REDNESS: 0

## 2024-10-16 NOTE — PROGRESS NOTES
Patient seen in office for LT hip injection 7/17/24. 3/10 pain level. Stated injections do help.  
for confusion.        Past Medical History:   Diagnosis Date    Anxiety     Hypertension        Objective:   Physical Exam  Constitutional:       Appearance: She is well-developed.   HENT:      Head: Normocephalic.      Nose: No congestion.   Eyes:      Pupils: Pupils are equal, round, and reactive to light.   Pulmonary:      Effort: Pulmonary effort is normal.   Musculoskeletal:         General: Tenderness present. No deformity. Normal range of motion.      Cervical back: Normal range of motion.      Right hip: No deformity, lacerations, tenderness, bony tenderness or crepitus. Normal range of motion. Normal strength.      Left hip: Tenderness present. No deformity, lacerations, bony tenderness or crepitus. Normal range of motion. Normal strength.      Right knee: Normal.      Left knee: Normal.   Skin:     General: Skin is warm and dry.      Capillary Refill: Capillary refill takes less than 2 seconds.      Coloration: Skin is not pale.      Findings: No erythema or rash.   Neurological:      Mental Status: She is alert and oriented to person, place, and time.      Sensory: No sensory deficit.      Motor: No weakness.       Left hip-Skin intact with no erythema, ecchymosis or lacerations present.  Mild pain in the left groin with range of motion.  Moderate tenderness over the left greater trochanter.  Positive straight leg raise on the left.    XR HIP 1 VW W PELVIS LEFT    Result Date: 12/13/2023  XRAY X-ray 2 views of the left hip and AP pelvis  reviewed by me today in the office demonstrates age appropriate bone density throughout with mild to moderate degenerative changes of the left hip joint with medial joint space narrowing, early osteophyte formation on the inferior acetabulum, no subluxation or dislocation noted, no acute osseous abnormalities.  There is also noted a well-positioned right total hip arthroplasty. Impression: Mild to moderate degenerative changes of the left hip as above with no acute

## 2024-11-11 ENCOUNTER — OFFICE VISIT (OUTPATIENT)
Dept: ORTHOPEDIC SURGERY | Age: 64
End: 2024-11-11

## 2024-11-11 VITALS
SYSTOLIC BLOOD PRESSURE: 152 MMHG | BODY MASS INDEX: 21.97 KG/M2 | DIASTOLIC BLOOD PRESSURE: 102 MMHG | OXYGEN SATURATION: 98 % | WEIGHT: 128 LBS | HEART RATE: 88 BPM

## 2024-11-11 DIAGNOSIS — Z87.81 S/P ORIF (OPEN REDUCTION INTERNAL FIXATION) FRACTURE: Primary | ICD-10-CM

## 2024-11-11 DIAGNOSIS — Z98.890 S/P ORIF (OPEN REDUCTION INTERNAL FIXATION) FRACTURE: Primary | ICD-10-CM

## 2024-11-11 PROCEDURE — 99024 POSTOP FOLLOW-UP VISIT: CPT | Performed by: STUDENT IN AN ORGANIZED HEALTH CARE EDUCATION/TRAINING PROGRAM

## 2024-11-11 NOTE — PROGRESS NOTES
Date of surgery: 2024     Procedure:  Right clavicle open reduction internal fixation      History:  [place] is here in follow up regarding their 10-week follow-up appoint for above procedure    Patient is doing well. They have resolved 0/10 pain. They deny chest pain, SOB, calf pain,fever,wound drainage.  No other issues.  Patient denies any constitutional symptoms.    Patient states they have been compliant with restrictions.    Patient states they have been out of their sling without issue    Continues to work on nicotine cessation    She has not began physical therapy as she states she was never contacted     Physical:   Patient demonstrates appropriate mood and affect.     Right upper extremity is in:   The incision is well-healed and is clean, dry, intact, and nontender.  There is some superficial healing needs to take place with mild marcus-incisional erythema. They have no edema, the Arm compartments are soft . There are No cords or calf tenderness.  No significant calf/ankle edema. They are neurovascularly intact distally.     Range of motion of the right shoulder demonstrates full range of motion with significantly improved stiffness and no pain with full overhead motion    No areas of tenderness to palpation    Negative Coy's    Imagin views right clavicle in a skeletally mature patient demonstrates prior open reduction internal fixation with no concerning findings.  Expected postoperative changes noted.  Increased callus formation and healing, now with bone remodeling noted.  No displacement of the fracture or the hardware.      Previous imagin views right clavicle in a skeletally mature patient demonstrates prior open reduction internal fixation with no concerning findings.  Expected postoperative changes noted.  Increased callus formation and healing noted.  No displacement of the fracture or the hardware.    2 views right clavicle in a skeletally mature patient demonstrates prior open

## 2024-12-04 DIAGNOSIS — Z87.81 S/P ORIF (OPEN REDUCTION INTERNAL FIXATION) FRACTURE: ICD-10-CM

## 2024-12-04 DIAGNOSIS — Z98.890 S/P ORIF (OPEN REDUCTION INTERNAL FIXATION) FRACTURE: ICD-10-CM

## 2024-12-05 RX ORDER — CYCLOBENZAPRINE HCL 5 MG
5 TABLET ORAL 2 TIMES DAILY PRN
Qty: 30 TABLET | Refills: 0 | OUTPATIENT
Start: 2024-12-05 | End: 2024-12-15

## 2025-02-10 ENCOUNTER — OFFICE VISIT (OUTPATIENT)
Dept: ORTHOPEDIC SURGERY | Age: 65
End: 2025-02-10
Payer: COMMERCIAL

## 2025-02-10 VITALS — WEIGHT: 134 LBS | BODY MASS INDEX: 23 KG/M2 | HEART RATE: 87 BPM | OXYGEN SATURATION: 87 %

## 2025-02-10 DIAGNOSIS — Z87.81 S/P ORIF (OPEN REDUCTION INTERNAL FIXATION) FRACTURE: Primary | ICD-10-CM

## 2025-02-10 DIAGNOSIS — Z98.890 S/P ORIF (OPEN REDUCTION INTERNAL FIXATION) FRACTURE: Primary | ICD-10-CM

## 2025-02-10 PROCEDURE — 99213 OFFICE O/P EST LOW 20 MIN: CPT | Performed by: STUDENT IN AN ORGANIZED HEALTH CARE EDUCATION/TRAINING PROGRAM

## 2025-02-10 ASSESSMENT — ENCOUNTER SYMPTOMS
NAUSEA: 0
EYE PAIN: 0
FACIAL SWELLING: 0
PHOTOPHOBIA: 0
ABDOMINAL PAIN: 0
WHEEZING: 0
SHORTNESS OF BREATH: 0
COLOR CHANGE: 0
VOMITING: 0
BACK PAIN: 0
EYE REDNESS: 0
COUGH: 0
STRIDOR: 0

## 2025-02-10 NOTE — PATIENT INSTRUCTIONS
Follow up as needed for right shoulder.   Hip exercises attached.   Follow up next week for hip with new xrays.

## 2025-02-10 NOTE — PROGRESS NOTES
Date of surgery: 9/6/2024     Procedure:  Right clavicle open reduction internal fixation      History:  [place] is here in follow up regarding their 6-month follow-up appoint for above procedure    Patient is doing well. They have resolved 0/10 pain. They deny chest pain, SOB, calf pain,fever,wound drainage.  No other issues.  Patient denies any constitutional symptoms.    Patient states they have been compliant with restrictions.    Patient states they have been out of their sling without issue    Continues to work on nicotine cessation    Patient has been doing explant with range of motion and strength that she has no concerns with the shoulder.  She states that that plate is not irritating her but she does feel it every once a while.  She did have a recent fall injuring her hip and is can actually see us on Monday for this issue.  She is ambulating without assistive device       Review of Systems   Constitutional:  Negative for activity change, appetite change, chills, diaphoresis, fatigue, fever and unexpected weight change.   HENT:  Negative for congestion, ear pain, facial swelling, hearing loss and sneezing.    Eyes:  Negative for photophobia, pain and redness.   Respiratory:  Negative for cough, shortness of breath, wheezing and stridor.    Cardiovascular:  Negative for chest pain, palpitations and leg swelling.   Gastrointestinal:  Negative for abdominal pain, nausea and vomiting.   Endocrine: Negative for cold intolerance and heat intolerance.   Musculoskeletal:  Negative for arthralgias, back pain, gait problem, joint swelling, myalgias, neck pain and neck stiffness.   Skin:  Negative for color change, pallor, rash and wound.   Neurological:  Negative for dizziness, facial asymmetry, weakness and numbness.         Physical:   Patient demonstrates appropriate mood and affect.     Right upper extremity is in:   The incision is well-healed and is clean, dry, intact, and nontender.  There is some

## 2025-03-12 ENCOUNTER — OFFICE VISIT (OUTPATIENT)
Dept: ORTHOPEDIC SURGERY | Age: 65
End: 2025-03-12
Payer: COMMERCIAL

## 2025-03-12 VITALS — WEIGHT: 131 LBS | HEART RATE: 74 BPM | OXYGEN SATURATION: 98 % | BODY MASS INDEX: 22.49 KG/M2

## 2025-03-12 DIAGNOSIS — M70.62 GREATER TROCHANTERIC BURSITIS OF LEFT HIP: Primary | ICD-10-CM

## 2025-03-12 PROCEDURE — 99213 OFFICE O/P EST LOW 20 MIN: CPT | Performed by: STUDENT IN AN ORGANIZED HEALTH CARE EDUCATION/TRAINING PROGRAM

## 2025-03-12 ASSESSMENT — ENCOUNTER SYMPTOMS
VOMITING: 0
WHEEZING: 0
BACK PAIN: 0
COLOR CHANGE: 0
SORE THROAT: 0
SHORTNESS OF BREATH: 0
VOICE CHANGE: 0
COUGH: 0
NAUSEA: 0

## 2025-03-12 NOTE — PROGRESS NOTES
3/12/2025   Chief Complaint   Patient presents with    Hip Pain     Left hip pain        History of Present Illness:                             Bree Foley is a 64 y.o. female   Patient previously seen by us for ORIF R Clavicle is here for follow up on left hip bursitis injections previously handled by [facility].       This my first time seen the patient for left hip.  She has previously undergone left hip cortisone injections with good relief.  She did have a recent fall and aggravated the left hip.  No advanced imaging thus far.  Is ambulating without any assistive device or significant issue.    Patient is also having some patellar type pain from the fall.      Medical History  Patient's medications, allergies, past medical, surgical, social and family histories were reviewed and updated as appropriate.    Past Medical History:   Diagnosis Date    Anxiety     Hypertension      Past Surgical History:   Procedure Laterality Date    HYSTERECTOMY (CERVIX STATUS UNKNOWN)       No family history on file.  Social History     Socioeconomic History    Marital status:    Tobacco Use    Smoking status: Every Day     Current packs/day: 2.00     Types: Cigarettes    Smokeless tobacco: Never   Vaping Use    Vaping status: Never Used   Substance and Sexual Activity    Alcohol use: No    Drug use: Not Currently     Comment: OPIATES     Current Outpatient Medications   Medication Sig Dispense Refill    cephALEXin (KEFLEX) 500 MG capsule Take 1 capsule by mouth 3 times daily 30 capsule 0    ondansetron (ZOFRAN-ODT) 4 MG disintegrating tablet Take 1 tablet by mouth 3 times daily as needed for Nausea or Vomiting 21 tablet 0    naloxone 4 MG/0.1ML LIQD nasal spray PLEASE SEE ATTACHED FOR DETAILED DIRECTIONS      prednisoLONE acetate (PRED FORTE) 1 % ophthalmic suspension Apply 1 drop to eye      pantoprazole (PROTONIX) 40 MG tablet Take 1 tablet by mouth daily      ascorbic acid (VITAMIN C) 500 MG tablet Take 1

## 2025-03-12 NOTE — PROGRESS NOTES
Patient previously seen by us for ORIF R Clavicle is here for follow up on left hip bursitis injections previously handled by [facility].     Trochanteric bursitis Injection Procedure Note     Pre-operative Diagnosis: left hip trochanteric bursitis     Post-operative Diagnosis: same     Indications: Symptomatic relief of bursitis     Anesthesia: Lidocaine 1% and marcaine 0.5% without epinephrine without added sodium bicarbonate     Procedure Details      Verbal consent was obtained for the procedure. The point of maximal tenderness over the left greater trochanter was localized and prepped with alcohol. A 22 gauge needle was advanced down to the greater trochanter without difficulty  The space was then injected with 1 ml 1% lidocaine and 1 ml 0.5% marcaine and 1 ml of triamcinolone (KENALOG) 40mg/ml. The injection site was cleansed with isopropyl alcohol and a dressing was applied.     Complications:  None; patient tolerated the procedure well.  Symptoms were improved immediately after the injection.